# Patient Record
Sex: MALE | NOT HISPANIC OR LATINO
[De-identification: names, ages, dates, MRNs, and addresses within clinical notes are randomized per-mention and may not be internally consistent; named-entity substitution may affect disease eponyms.]

---

## 2021-11-12 PROBLEM — Z00.00 ENCOUNTER FOR PREVENTIVE HEALTH EXAMINATION: Status: ACTIVE | Noted: 2021-11-12

## 2021-11-15 ENCOUNTER — APPOINTMENT (OUTPATIENT)
Dept: CARDIOLOGY | Facility: CLINIC | Age: 36
End: 2021-11-15
Payer: MEDICAID

## 2021-11-15 ENCOUNTER — NON-APPOINTMENT (OUTPATIENT)
Age: 36
End: 2021-11-15

## 2021-11-15 VITALS
HEART RATE: 86 BPM | RESPIRATION RATE: 16 BRPM | SYSTOLIC BLOOD PRESSURE: 156 MMHG | DIASTOLIC BLOOD PRESSURE: 88 MMHG | OXYGEN SATURATION: 97 % | WEIGHT: 166.5 LBS | HEIGHT: 69 IN | BODY MASS INDEX: 24.66 KG/M2 | TEMPERATURE: 98.1 F

## 2021-11-15 VITALS — SYSTOLIC BLOOD PRESSURE: 138 MMHG | DIASTOLIC BLOOD PRESSURE: 82 MMHG

## 2021-11-15 DIAGNOSIS — R07.89 OTHER CHEST PAIN: ICD-10-CM

## 2021-11-15 DIAGNOSIS — R00.2 PALPITATIONS: ICD-10-CM

## 2021-11-15 DIAGNOSIS — R06.02 SHORTNESS OF BREATH: ICD-10-CM

## 2021-11-15 DIAGNOSIS — Z78.9 OTHER SPECIFIED HEALTH STATUS: ICD-10-CM

## 2021-11-15 DIAGNOSIS — E11.9 TYPE 2 DIABETES MELLITUS W/OUT COMPLICATIONS: ICD-10-CM

## 2021-11-15 DIAGNOSIS — E78.5 HYPERLIPIDEMIA, UNSPECIFIED: ICD-10-CM

## 2021-11-15 DIAGNOSIS — Z82.49 FAMILY HISTORY OF ISCHEMIC HEART DISEASE AND OTHER DISEASES OF THE CIRCULATORY SYSTEM: ICD-10-CM

## 2021-11-15 DIAGNOSIS — Z83.3 FAMILY HISTORY OF DIABETES MELLITUS: ICD-10-CM

## 2021-11-15 PROCEDURE — 99204 OFFICE O/P NEW MOD 45 MIN: CPT

## 2021-11-15 PROCEDURE — 93000 ELECTROCARDIOGRAM COMPLETE: CPT

## 2021-11-15 RX ORDER — METFORMIN HYDROCHLORIDE 1000 MG/1
1000 TABLET, COATED ORAL
Qty: 60 | Refills: 0 | Status: ACTIVE | COMMUNITY
Start: 2021-10-12

## 2021-11-15 NOTE — DISCUSSION/SUMMARY
[FreeTextEntry1] : atypical chest sx - risk factors include a hx of diabetes previously not well controlled, dyslipidemia, family hx\par -check treadmill stress test\par -if no clear evidence of ischemia, favor checking coronary calcium score for further risk stratification in statin-averse pt\par \par palpitations, some associated SOB, white coat hypertension\par -check TTE to evaluate for structural heart disease.\par \par dyslipidemia - goal LDL < 70\par recommend against ketogenic diet/continuation of high intake of vegetables\par   which may also be exacerbated by the high-fat nature of the diet\par given the goal of pt's ketogenic diet is to decrease insulin resistance, it is unclear if there is net benefit given elevation in lipid profile in light of cardiometabolic risk factors\par regardless, goal LDL < 70 - high intensity statin therapy is indicated. pt will start with rosuvastatin 5mg daily\par \par DM\par -heart healthy as above\par -if continues to be intolerant to metformin we discussed other potential medications with cardiovascualr benefit including GLP1a and SGLT2i. further recommendations pending above workup

## 2021-11-15 NOTE — HISTORY OF PRESENT ILLNESS
[FreeTextEntry1] : PCP: Dr. Angelito Issa\par \par 36M DM (dx age 24), dyslipidemia, elevated blood pressure without dx of hypertension, hx of Grave's dz who presents for evaluation of chest pressure.\par \par He is physically active and exercises regularly with target HR.\par He notes occ episodes of vague chest pinching/pressure/tingling that also radiates to the L arm. symptoms are nonexertional.\par denies palpitations, LE edema, orthopnea\par \par Had some interval increase in his LDL while following a ketogenic diet.\par incorporating more plant-based fats/proteins including avocado and walnuts\par taking metformin but still experiencing some GI side effects even after being on for the past few months\par was previously rx'd rosuvastatin but did not take due to concerns with increasing insulin resistance\par \par Fam hx:\par Father - DM,  age 57 heart disease\par Mat uncle  - CABG early 60s\par \par \par 2021\par Total Cholesterol 239//HDL 44/\par a1c 6.3\par albumin/Cr ratio 20\par \par \par 2021\par \par a1c 10.4

## 2021-11-15 NOTE — REVIEW OF SYSTEMS
[Chest Discomfort] : chest discomfort [Abdominal Pain] : abdominal pain [Negative] : Heme/Lymph [SOB] : no shortness of breath [Dyspnea on exertion] : not dyspnea during exertion [Lower Ext Edema] : no extremity edema [Leg Claudication] : no intermittent leg claudication [Palpitations] : no palpitations [Orthopnea] : no orthopnea [PND] : no PND [Syncope] : no syncope

## 2021-12-10 ENCOUNTER — APPOINTMENT (OUTPATIENT)
Dept: CARDIOLOGY | Facility: CLINIC | Age: 36
End: 2021-12-10
Payer: MEDICAID

## 2021-12-10 PROCEDURE — 93015 CV STRESS TEST SUPVJ I&R: CPT
